# Patient Record
Sex: FEMALE | Race: WHITE | URBAN - METROPOLITAN AREA
[De-identification: names, ages, dates, MRNs, and addresses within clinical notes are randomized per-mention and may not be internally consistent; named-entity substitution may affect disease eponyms.]

---

## 2017-03-09 ENCOUNTER — OFFICE VISIT - HEALTHEAST (OUTPATIENT)
Dept: ENDOCRINOLOGY | Facility: CLINIC | Age: 47
End: 2017-03-09

## 2017-03-09 DIAGNOSIS — E03.9 HYPOTHYROID: ICD-10-CM

## 2017-03-14 ENCOUNTER — COMMUNICATION - HEALTHEAST (OUTPATIENT)
Dept: ENDOCRINOLOGY | Facility: CLINIC | Age: 47
End: 2017-03-14

## 2017-03-14 DIAGNOSIS — E03.9 HYPOTHYROID: ICD-10-CM

## 2017-04-04 ENCOUNTER — COMMUNICATION - HEALTHEAST (OUTPATIENT)
Dept: ENDOCRINOLOGY | Facility: CLINIC | Age: 47
End: 2017-04-04

## 2017-04-04 DIAGNOSIS — E03.9 HYPOTHYROID: ICD-10-CM

## 2017-06-20 ENCOUNTER — COMMUNICATION - HEALTHEAST (OUTPATIENT)
Dept: ENDOCRINOLOGY | Facility: CLINIC | Age: 47
End: 2017-06-20

## 2017-06-26 ENCOUNTER — AMBULATORY - HEALTHEAST (OUTPATIENT)
Dept: ENDOCRINOLOGY | Facility: CLINIC | Age: 47
End: 2017-06-26

## 2017-06-26 DIAGNOSIS — E03.9 HYPOTHYROID: ICD-10-CM

## 2017-06-27 ENCOUNTER — RECORDS - HEALTHEAST (OUTPATIENT)
Dept: ADMINISTRATIVE | Facility: OTHER | Age: 47
End: 2017-06-27

## 2017-07-20 ENCOUNTER — OFFICE VISIT - HEALTHEAST (OUTPATIENT)
Dept: ENDOCRINOLOGY | Facility: CLINIC | Age: 47
End: 2017-07-20

## 2017-07-20 DIAGNOSIS — E03.9 HYPOTHYROID: ICD-10-CM

## 2017-07-20 ASSESSMENT — MIFFLIN-ST. JEOR: SCORE: 1043.17

## 2017-08-11 ENCOUNTER — COMMUNICATION - HEALTHEAST (OUTPATIENT)
Dept: ENDOCRINOLOGY | Facility: CLINIC | Age: 47
End: 2017-08-11

## 2017-12-31 ENCOUNTER — HEALTH MAINTENANCE LETTER (OUTPATIENT)
Age: 47
End: 2017-12-31

## 2018-05-03 ENCOUNTER — COMMUNICATION - HEALTHEAST (OUTPATIENT)
Dept: ENDOCRINOLOGY | Facility: CLINIC | Age: 48
End: 2018-05-03

## 2018-05-03 DIAGNOSIS — E03.9 HYPOTHYROID: ICD-10-CM

## 2018-05-08 ENCOUNTER — COMMUNICATION - HEALTHEAST (OUTPATIENT)
Dept: ENDOCRINOLOGY | Facility: CLINIC | Age: 48
End: 2018-05-08

## 2018-05-08 DIAGNOSIS — E03.9 HYPOTHYROID: ICD-10-CM

## 2018-06-22 ENCOUNTER — COMMUNICATION - HEALTHEAST (OUTPATIENT)
Dept: ENDOCRINOLOGY | Facility: CLINIC | Age: 48
End: 2018-06-22

## 2018-06-22 DIAGNOSIS — E03.9 HYPOTHYROID: ICD-10-CM

## 2018-06-26 ENCOUNTER — COMMUNICATION - HEALTHEAST (OUTPATIENT)
Dept: ENDOCRINOLOGY | Facility: CLINIC | Age: 48
End: 2018-06-26

## 2018-06-26 DIAGNOSIS — E03.9 HYPOTHYROID: ICD-10-CM

## 2018-07-20 ENCOUNTER — COMMUNICATION - HEALTHEAST (OUTPATIENT)
Dept: ENDOCRINOLOGY | Facility: CLINIC | Age: 48
End: 2018-07-20

## 2018-07-20 DIAGNOSIS — E03.9 HYPOTHYROID: ICD-10-CM

## 2018-07-25 ENCOUNTER — COMMUNICATION - HEALTHEAST (OUTPATIENT)
Dept: ENDOCRINOLOGY | Facility: CLINIC | Age: 48
End: 2018-07-25

## 2018-07-25 DIAGNOSIS — E03.9 HYPOTHYROID: ICD-10-CM

## 2018-08-08 ENCOUNTER — RECORDS - HEALTHEAST (OUTPATIENT)
Dept: ADMINISTRATIVE | Facility: OTHER | Age: 48
End: 2018-08-08

## 2018-08-16 ENCOUNTER — OFFICE VISIT - HEALTHEAST (OUTPATIENT)
Dept: ENDOCRINOLOGY | Facility: CLINIC | Age: 48
End: 2018-08-16

## 2018-08-16 DIAGNOSIS — E03.9 HYPOTHYROID: ICD-10-CM

## 2018-08-16 ASSESSMENT — MIFFLIN-ST. JEOR: SCORE: 1047.71

## 2018-09-26 ENCOUNTER — COMMUNICATION - HEALTHEAST (OUTPATIENT)
Dept: ENDOCRINOLOGY | Facility: CLINIC | Age: 48
End: 2018-09-26

## 2018-09-26 DIAGNOSIS — E03.9 HYPOTHYROID: ICD-10-CM

## 2018-10-17 ENCOUNTER — COMMUNICATION - HEALTHEAST (OUTPATIENT)
Dept: ENDOCRINOLOGY | Facility: CLINIC | Age: 48
End: 2018-10-17

## 2018-10-17 DIAGNOSIS — E03.9 HYPOTHYROID: ICD-10-CM

## 2018-12-18 ENCOUNTER — COMMUNICATION - HEALTHEAST (OUTPATIENT)
Dept: ENDOCRINOLOGY | Facility: CLINIC | Age: 48
End: 2018-12-18

## 2018-12-18 DIAGNOSIS — E03.9 HYPOTHYROID: ICD-10-CM

## 2018-12-29 ENCOUNTER — COMMUNICATION - HEALTHEAST (OUTPATIENT)
Dept: ENDOCRINOLOGY | Facility: CLINIC | Age: 48
End: 2018-12-29

## 2018-12-29 DIAGNOSIS — E03.9 HYPOTHYROID: ICD-10-CM

## 2019-01-17 ENCOUNTER — COMMUNICATION - HEALTHEAST (OUTPATIENT)
Dept: ENDOCRINOLOGY | Facility: CLINIC | Age: 49
End: 2019-01-17

## 2019-01-17 DIAGNOSIS — E03.9 HYPOTHYROID: ICD-10-CM

## 2019-03-24 ENCOUNTER — COMMUNICATION - HEALTHEAST (OUTPATIENT)
Dept: ENDOCRINOLOGY | Facility: CLINIC | Age: 49
End: 2019-03-24

## 2019-03-25 ENCOUNTER — COMMUNICATION - HEALTHEAST (OUTPATIENT)
Dept: ENDOCRINOLOGY | Facility: CLINIC | Age: 49
End: 2019-03-25

## 2019-03-25 DIAGNOSIS — E03.9 HYPOTHYROID: ICD-10-CM

## 2019-03-28 ENCOUNTER — COMMUNICATION - HEALTHEAST (OUTPATIENT)
Dept: ENDOCRINOLOGY | Facility: CLINIC | Age: 49
End: 2019-03-28

## 2019-04-01 ENCOUNTER — COMMUNICATION - HEALTHEAST (OUTPATIENT)
Dept: ENDOCRINOLOGY | Facility: CLINIC | Age: 49
End: 2019-04-01

## 2019-04-01 DIAGNOSIS — E03.9 HYPOTHYROID: ICD-10-CM

## 2019-05-02 ENCOUNTER — COMMUNICATION - HEALTHEAST (OUTPATIENT)
Dept: ENDOCRINOLOGY | Facility: CLINIC | Age: 49
End: 2019-05-02

## 2019-05-02 DIAGNOSIS — E03.9 HYPOTHYROID: ICD-10-CM

## 2019-10-09 ENCOUNTER — COMMUNICATION - HEALTHEAST (OUTPATIENT)
Dept: ENDOCRINOLOGY | Facility: CLINIC | Age: 49
End: 2019-10-09

## 2019-10-09 DIAGNOSIS — E03.9 HYPOTHYROID: ICD-10-CM

## 2020-03-10 ENCOUNTER — HEALTH MAINTENANCE LETTER (OUTPATIENT)
Age: 50
End: 2020-03-10

## 2020-12-27 ENCOUNTER — HEALTH MAINTENANCE LETTER (OUTPATIENT)
Age: 50
End: 2020-12-27

## 2021-04-24 ENCOUNTER — HEALTH MAINTENANCE LETTER (OUTPATIENT)
Age: 51
End: 2021-04-24

## 2021-05-27 NOTE — TELEPHONE ENCOUNTER
I have called the p, you have given me the information. She takes 112 on MWF , the rest of the days she takes 100 mcg. I have advised her to take 112 mcg daily. Check tsh, ft4 before next visit or sooner if she felt unwell.

## 2021-05-27 NOTE — TELEPHONE ENCOUNTER
Pt's TSH is 3.02 and Free T4 is 0.93, drawn on 3/26/19. The pt is on synthroid 112 mcg daily. Please advise on if any changes.

## 2021-05-30 VITALS — BODY MASS INDEX: 20.22 KG/M2 | WEIGHT: 107 LBS

## 2021-05-31 VITALS — WEIGHT: 108.2 LBS | BODY MASS INDEX: 20.43 KG/M2 | HEIGHT: 61 IN

## 2021-06-01 VITALS — WEIGHT: 109.2 LBS | HEIGHT: 61 IN | BODY MASS INDEX: 20.62 KG/M2

## 2021-06-09 NOTE — PROGRESS NOTES
Progress Note    Reason for Visit:  Chief Complaint     Consult          Progress Note:    HPI:     This patient is seen in consultation at the request of Dr. aguirre because of hypothyroidism due to Hashimoto's thyroiditis.    The patient is a 46-year-old female patient.  She is a pediatrician.    Back in 2011 she has been trying to get pregnant desats was checked and was found high at 5 she was started on Synthroid then switch to our mother and she managed to get pregnant and she has a 5-year-old.    After that the patient was switched back to Synthroid and only got pregnant again by the addition of our mother but she had a miscarriage and she was hyper replaced.    She is denying family history of thyroid problems.    Thyroid exam is normal.    Review of Systems:    Nervous System: No headache, dizziness, fainting or memory loss. No tingling sensation of hand or feet.  Ears: No hearing loss or ringing in the ears  Eyes: No blurring of vision, redness, itching or dryness.  Nose: No nosebleed or loss of smell  Mouth: No mouth sores or loss of taste  Throat: No hoarseness or difficulty swallowing  Neck: No enlarged thyroid or lymph nodes.  Heart: No chest pain, palpitation or irregular heartbeat. No swelling of hands or feet  Lungs: No shortness of breath, cough, night sweats, wheezing or hemoptysis.  Gastrointestinal: No nausea or vomiting, constipation or diarrhea.  No acid reflux, abdominal pain or blood in stools.  Kidney/Bladdr: No polyuria, polydipsia, nocturia or hematuria.  Genital/Sexual: No loss of libido  Skin: No rash, hair loss or hirsutism.  No abnormal striae  Muscles/Joints/Bones: No morning stiffness, muscle aches and pain or loss of height.    Current Medications:  Current Outpatient Prescriptions   Medication Sig     levothyroxine (SYNTHROID, LEVOTHROID) 100 MCG tablet Take 100 mcg by mouth daily.     multivitamin with minerals (THERA-M) 4.5 mg iron-200 mcg Tab Take 1 half-tablet by mouth daily.      sertraline (ZOLOFT) 50 MG tablet Take 75 mg by mouth daily.       Patients Active Problems:  There is no problem list on file for this patient.      History:      has no tobacco, alcohol, and drug history on file.  History   Smoking Status     Not on file   Smokeless Tobacco     Not on file      has no tobacco, alcohol, and drug history on file.  History   Sexual Activity     Sexual activity: Not on file     No past medical history on file.  No family history on file.  No past medical history on file.  No past surgical history on file.    Vitals   weight is 107 lb (48.5 kg). Her blood pressure is 102/68 and her pulse is 68.         Exam  General appearance: The patient looked well, not in acute distress.  Eyes: no evidence of thyroid eye disease.   Retinal exam: No evidence of diabetic retinopathy.  Mouth and Throat: Normal  Neck: No evidence of thyromegaly, enlarged lymph node or tenderness  Chest: Trachea is central. Chest is clear to auscultation and percussion. Breat sounds are normal.  Cardiovascular exam: JVP is not raised. Heart sounds are normal, no murmurs or rub  Peripheral pulses are palpable.   Abdomen: No masses or tenderness.    Back: No vertebral tenderness or kyphosis.  Extremities: No evidence of leg edema.   Skin: Normal to touch.  No abnormal striae  Neurologic exam:  Visual fields are intact by confrontation, grossly intact. No evidence of peripheral neuropathy.  Detailed foot exam normal.        Diagnosis:  Encounter Diagnoses   Name Primary?     Hypothyroid Yes       Orders:   Orders Placed This Encounter   Procedures     T3, Total     T4, Free     Thyroid Stimulating Hormone (TSH)     Calcium     Vitamin D, Total (25-Hydroxy)     Luteinizing Hormone (LH)     Follicle Stimulating Hormone (FSH)     Estradiol         Assessment and Plan:  Hypothyroidism due to Hashimoto's disease.    The patient is currently on Synthroid 100  g daily she feels fatigued and tired was being followed she does  have depression and irregular cycles last one was 3 month ago.    I will check a thyroid function test today in the T3 is normal patellar are marked symptoms she said once she is on are much she gets pregnant and I did advise her if that happened we should stop the mom put her back on Synthroid.    She does have depression she is taking Zoloft 75 mg.    Routine has initiated otherwise up-to-date vitamin D 2000 units a day.    Patient reported to clinic in 4 month.    I dispense 60 minutes with the patient more than 50% was spent in counseling and managing indicated.

## 2021-06-12 NOTE — PROGRESS NOTES
Progress Note    Reason for Visit:  Chief Complaint     Follow-up          Progress Note:    HPI:    This pleasant 46-year-old female patient is here for follow-up because of hypothyroidism.    Component      Latest Ref Rng & Units 3/9/2017   T3, Total      45 - 175 ng/dL 63   Free T4      0.7 - 1.8 ng/dL 1.1   TSH      0.30 - 5.00 uIU/mL 2.22   Calcium      8.5 - 10.5 mg/dL 9.9   Vitamin D, Total (25-Hydroxy)      30.0 - 80.0 ng/mL 23.8 (L)   LH      mIU/mL 32.2   FSH      mIU/mL 69.0   Estradiol      pg/mL <10         Review of Systems:    Nervous System: No headache, dizziness, fainting or memory loss. No tingling sensation of hand or feet.  Ears: No hearing loss or ringing in the ears  Eyes: No blurring of vision, redness, itching or dryness.  Nose: No nosebleed or loss of smell  Mouth: No mouth sores or loss of taste  Throat: No hoarseness or difficulty swallowing  Neck: No enlarged thyroid or lymph nodes.  Heart: No chest pain, palpitation or irregular heartbeat. No swelling of hands or feet  Lungs: No shortness of breath, cough, night sweats, wheezing or hemoptysis.  Gastrointestinal: No nausea or vomiting, constipation or diarrhea.  No acid reflux, abdominal pain or blood in stools.  Kidney/Bladdr: No polyuria, polydipsia, nocturia or hematuria.  Genital/Sexual: No loss of libido  Skin: No rash, hair loss or hirsutism.  No abnormal striae  Muscles/Joints/Bones: No morning stiffness, muscle aches and pain or loss of height.    Current Medications:  Current Outpatient Prescriptions   Medication Sig     cholecalciferol, vitamin D3, (VITAMIN D3) 2,000 unit capsule Take 1,000 Units by mouth daily.     levothyroxine (SYNTHROID, LEVOTHROID) 100 MCG tablet Take 1 tablet (100 mcg total) by mouth daily.     multivitamin with minerals (THERA-M) 4.5 mg iron-200 mcg Tab Take 1 half-tablet by mouth daily.     sertraline (ZOLOFT) 50 MG tablet Take 75 mg by mouth daily.       Patients Active Problems:  Patient Active  Problem List   Diagnosis     Hypothyroid       History:      has no tobacco, alcohol, and drug history on file.  History   Smoking Status     Not on file   Smokeless Tobacco     Not on file      has no tobacco, alcohol, and drug history on file.  History   Sexual Activity     Sexual activity: Not on file     No past medical history on file.  No family history on file.  No past medical history on file.  No past surgical history on file.    Vitals   vitals were not taken for this visit.        Exam  General appearance: The patient looked well, not in acute distress.  Eyes: no evidence of thyroid eye disease.   Retinal exam: No evidence of diabetic retinopathy.  Mouth and Throat: Normal  Neck: No evidence of thyromegaly, enlarged lymph node or tenderness  Chest: Trachea is central. Chest is clear to auscultation and percussion. Breat sounds are normal.  Cardiovascular exam: JVP is not raised. Heart sounds are normal, no murmurs or rub  Peripheral pulses are palpable.   Abdomen: No masses or tenderness.    Back: No vertebral tenderness or kyphosis.  Extremities: No evidence of leg edema.   Skin: Normal to touch.  No abnormal striae  Neurologic exam:  Visual fields are intact by confrontation, grossly intact. No evidence of peripheral neuropathy.  Detailed foot exam normal.        Diagnosis:  No diagnosis found.    Orders:   No orders of the defined types were placed in this encounter.        Assessment and Plan:  Hypothyroidism she is currently on Synthroid 100 mcg daily TSH is 2.93 free T4 1.05 3370.    She has some fatigue and tiredness.    Was achy joints.    I did advise the patient to increase Synthroid to, 112 mcg on Monday Wednesday Friday the rest of the days will stay 100 mcg.    She is having minimal menopausal symptoms but she takes Zoloft 75 mg which is helping.    We checked her LH and FSH that was high and estradiol was low.    I did  the patient regarding HRT and I advised her against that.    She  is taking vitamin D 2000 units daily we will continue with that.    We will check her thyroid function test every 3 months and return to clinic in 6 months I did spend 40 minutes with the patient more than 50% was spent in counseling and managing her care.

## 2021-06-19 NOTE — PROGRESS NOTES
Progress Note    Reason for Visit:  Chief Complaint     Thyroid Problem          Progress Note:    HPI: Patient is here for follow-up because of hypothyroidism.      Component      Latest Ref Rng & Units 3/9/2017   T3, Total      45 - 175 ng/dL 63   Free T4      0.7 - 1.8 ng/dL 1.1   TSH      0.30 - 5.00 uIU/mL 2.22   Calcium      8.5 - 10.5 mg/dL 9.9   Vitamin D, Total (25-Hydroxy)      30.0 - 80.0 ng/mL 23.8 (L)   LH      mIU/mL 32.2   FSH      mIU/mL 69.0   Estradiol      pg/mL <10     Review of Systems:    Nervous System: No headache, dizziness, fainting or memory loss. No tingling sensation of hand or feet.  Ears: No hearing loss or ringing in the ears  Eyes: No blurring of vision, redness, itching or dryness.  Nose: No nosebleed or loss of smell  Mouth: No mouth sores or loss of taste  Throat: No hoarseness or difficulty swallowing  Neck: No enlarged thyroid or lymph nodes.  Heart: No chest pain, palpitation or irregular heartbeat. No swelling of hands or feet  Lungs: No shortness of breath, cough, night sweats, wheezing or hemoptysis.  Gastrointestinal: No nausea or vomiting, constipation or diarrhea.  No acid reflux, abdominal pain or blood in stools.  Kidney/Bladdr: No polyuria, polydipsia, nocturia or hematuria.  Genital/Sexual: No loss of libido  Skin: No rash, hair loss or hirsutism.  No abnormal striae  Muscles/Joints/Bones: No morning stiffness, muscle aches and pain or loss of height.    Current Medications:  Current Outpatient Prescriptions   Medication Sig     levothyroxine (SYNTHROID, LEVOTHROID) 100 MCG tablet Take 1 tablet on Tuesday, Thursday, Saturday and Sunday.     levothyroxine (SYNTHROID, LEVOTHROID) 112 MCG tablet 112 mcg on Monday Wednesday Friday the rest of the days take 100 mcg.     multivitamin with minerals (THERA-M) 4.5 mg iron-200 mcg Tab Take 1 half-tablet by mouth daily.     sertraline (ZOLOFT) 25 MG tablet Take 25 mg by mouth daily.     sertraline (ZOLOFT) 50 MG tablet Take 75  "mg by mouth daily.       Patients Active Problems:  Patient Active Problem List   Diagnosis     Hypothyroid       History:   reports that she has never smoked. She has never used smokeless tobacco.   reports that she has never smoked. She has never used smokeless tobacco. Her alcohol and drug histories are not on file.  History   Smoking Status     Never Smoker   Smokeless Tobacco     Never Used      reports that she has never smoked. She has never used smokeless tobacco. Her alcohol and drug histories are not on file.  History   Sexual Activity     Sexual activity: Not on file     No past medical history on file.  No family history on file.  No past medical history on file.  No past surgical history on file.    Vitals   height is 5' 1\" (1.549 m) and weight is 109 lb 3.2 oz (49.5 kg). Her blood pressure is 98/60.         Exam  General appearance: The patient looked well, not in acute distress.  Eyes: no evidence of thyroid eye disease.   Retinal exam: No evidence of diabetic retinopathy.  Mouth and Throat: Normal  Neck: No evidence of thyromegaly, enlarged lymph node or tenderness  Chest: Trachea is central. Chest is clear to auscultation and percussion. Breat sounds are normal.  Cardiovascular exam: JVP is not raised. Heart sounds are normal, no murmurs or rub  Peripheral pulses are palpable.   Abdomen: No masses or tenderness.    Back: No vertebral tenderness or kyphosis.  Extremities: No evidence of leg edema.   Skin: Normal to touch.  No abnormal striae  Neurologic exam:  Visual fields are intact by confrontation, grossly intact. No evidence of peripheral neuropathy.  Detailed foot exam normal.        Diagnosis:  No diagnosis found.    Orders:   No orders of the defined types were placed in this encounter.        Assessment and Plan: Hypothyroidism patient tried [facility] and it did not work for her.    We have been gradually increasing her dose is currently on Synthroid 112 mcg on Monday Wednesday Friday " and the rest of the day she takes 100 mcg.    TSH came down from 2.93-1.22 free T4 1.032 3379.    She has good energy level but she is concerned about brain fog.    This is most likely menopausal symptoms.  FSH is 69 LDH is 32.2.    She is currently on Zoloft 75 mg and is helping    As a said [facility] did not agree with her.        It made her feel edgy    We discussed all these data in details.    Vitamin D deficiency T is 23.8 I did advise her to take 2000 units daily over-the-counter thyroid exam is normal.  She will return to clinic in 6 month .    I have reviewed and ordered clinical lab test    I have reviewed and ordered radiology tests.    I have reviewed and ordered her medication as required.    I have reviewed her test results and advised with the performing physician.    I have reviewed the patient's old records.    I have reviewed and summarized the patient old records.    I did spend 25 minutes with the patient more than 50% was spent on counseling and managing her care.

## 2021-06-23 NOTE — TELEPHONE ENCOUNTER
01.24- lmtcb x3  
Called and let pt know that  will refill the med but pt will need to be seen once a year. Pt received the labs order and will do it the next couple days and fax it to us.     
LMTCB x1  
LMTCB x2    
Lab ordered faxed  
Please call pt to [facility] lab, prior to appt.   Order placed.   
Please fax lab order to [phone].    Patient may have to reschedule her appointment to July.  Will you continue to refill meds with lab results?   
I have reviewed and confirmed nurses' notes for patient's medications, allergies, medical history, and surgical history.

## 2021-10-04 ENCOUNTER — HEALTH MAINTENANCE LETTER (OUTPATIENT)
Age: 51
End: 2021-10-04

## 2022-05-15 ENCOUNTER — HEALTH MAINTENANCE LETTER (OUTPATIENT)
Age: 52
End: 2022-05-15

## 2022-09-11 ENCOUNTER — HEALTH MAINTENANCE LETTER (OUTPATIENT)
Age: 52
End: 2022-09-11

## 2023-06-03 ENCOUNTER — HEALTH MAINTENANCE LETTER (OUTPATIENT)
Age: 53
End: 2023-06-03

## 2024-09-06 ENCOUNTER — TRANSCRIBE ORDERS (OUTPATIENT)
Dept: OTHER | Age: 54
End: 2024-09-06

## 2024-09-06 DIAGNOSIS — R92.30 DENSE BREAST TISSUE: Primary | ICD-10-CM

## 2024-09-09 ENCOUNTER — PATIENT OUTREACH (OUTPATIENT)
Dept: ONCOLOGY | Facility: CLINIC | Age: 54
End: 2024-09-09
Payer: COMMERCIAL

## 2024-09-09 NOTE — PROGRESS NOTES
New Patient Oncology Nurse Navigator Note     Referring provider: Sharifa Arnold APRN CNP      Referring Clinic/Organization: [facility]      Referred to (specialty:) Medical Oncology although most appropriate for Breast Provider Referral on navigator chart review     Date Referral Received: September 6, 2024     Evaluation for:  R92.30 (ICD-10-CM) - Dense breast tissue     Clinical History (per Nurse review of records provided):      3/16/23 - Bilateral screening mammograms  FINDINGS:  The breast tissue is extremely dense. No mass, malignant appearing calcifications or secondary signs of carcinoma evident. No mammographic evidence of malignancy in the breasts.  Recommend continued screening.     Colon Cancer Paternal Grandfather 75. Patient states there have been no other cancer diagnoses on mother or father's side of family.    9/10 1135 - Telephoned and left voice message for patient requesting call back.     9/16 0882 - Telephoned and left voice message for patient requesting call back.     9/19 1123 - Chago returned call and confirms her most recent breast imaging was screening mammogram on 3/16/23. She is due to screening mammogram and would like to call [facility] to schedule that. Once that is scheduled she will call writer back and we'll move forward with consult with Francoise Caldwell PA-C. She had clinical breast exam at most recent ordering provider appointment and  there are no new or concerning breast symptoms from provider or patient evaluation.     9/20/24 - Screening mammogram at [facility].   FINDINGS: The breasts are extremely dense, which lowers the sensitivity of mammography. No mass, malignant appearing calcifications or secondary signs of carcinoma evident. No mammographic evidence of malignancy in the breasts. Recommend continued screening.     10/8 Writer received referral, reviewed for appropriate plan, and referral transferred to New Patient Scheduling for  completion.    Records Location: Care Everywhere, Media, and See Bookmarked material     Records Needed:   Breast imaging for past 5 years - Nothing on PACS as of 10/8/24.   Please be sure we get 9/20/24 mammogram report and imaging.

## 2024-09-21 ENCOUNTER — HEALTH MAINTENANCE LETTER (OUTPATIENT)
Age: 54
End: 2024-09-21